# Patient Record
Sex: MALE | Race: BLACK OR AFRICAN AMERICAN
[De-identification: names, ages, dates, MRNs, and addresses within clinical notes are randomized per-mention and may not be internally consistent; named-entity substitution may affect disease eponyms.]

---

## 2018-02-07 ENCOUNTER — HOSPITAL ENCOUNTER (OUTPATIENT)
Dept: HOSPITAL 88 - RAD | Age: 83
End: 2018-02-07
Attending: FAMILY MEDICINE
Payer: MEDICARE

## 2018-02-07 DIAGNOSIS — R04.2: Primary | ICD-10-CM

## 2018-02-07 PROCEDURE — 71046 X-RAY EXAM CHEST 2 VIEWS: CPT

## 2018-02-07 NOTE — DIAGNOSTIC IMAGING REPORT
PROCEDURE:

Frontal and lateral views of the chest.

 

COMPARISON: 10/1/15

 

INDICATIONS:   COUGH, BLOOD IN PHLEGM

     

FINDINGS:

Lines/tubes:  None.

 

Lungs:  The lungs are well inflated and clear. There is no evidence of 

pneumonia or pulmonary edema. Unchanged elevated left hemidiaphragm.

 

Pleura:  There is no pleural effusion or pneumothorax.

 

Heart and mediastinum:  The cardiac silhouette is normal. Unchanged 

widening of mediastinal silhouette.

 

Bones:  No acute bony abnormality. Degenerative changes of thoracic 

spine.

 

IMPRESSION: 

 

No acute cardiopulmonary disease. No change from prior exam.

Unchanged elevation of left hemidiaphragm and widening of the 

mediastinum. 

 

Dictated by:  Jey Michaud M.D. on 2/07/2018 at 11:42     

Electronically approved by:  Jey Michaud M.D. on 2/07/2018 at 11:42

## 2023-01-30 ENCOUNTER — HOSPITAL ENCOUNTER (OUTPATIENT)
Dept: HOSPITAL 88 - PT | Age: 88
LOS: 1 days | End: 2023-01-31
Attending: SPECIALIST
Payer: MEDICARE

## 2023-01-30 DIAGNOSIS — M16.0: Primary | ICD-10-CM

## 2023-01-30 DIAGNOSIS — M47.816: ICD-10-CM

## 2023-02-10 ENCOUNTER — HOSPITAL ENCOUNTER (OUTPATIENT)
Dept: HOSPITAL 88 - PT | Age: 88
LOS: 18 days | End: 2023-02-28
Attending: SPECIALIST
Payer: MEDICARE

## 2023-02-10 DIAGNOSIS — M16.0: Primary | ICD-10-CM
